# Patient Record
Sex: MALE | Race: BLACK OR AFRICAN AMERICAN | NOT HISPANIC OR LATINO | Employment: OTHER | URBAN - METROPOLITAN AREA
[De-identification: names, ages, dates, MRNs, and addresses within clinical notes are randomized per-mention and may not be internally consistent; named-entity substitution may affect disease eponyms.]

---

## 2018-02-22 ENCOUNTER — TELEPHONE (OUTPATIENT)
Dept: UROLOGY | Facility: CLINIC | Age: 53
End: 2018-02-22

## 2018-02-22 NOTE — TELEPHONE ENCOUNTER
Received referral from Dr. Levine for Prostatitis- contacted patient in regards to scheduling an appt. Patient stated that he has already seen someone for this and is under the care of Dr. Anderson.

## 2019-04-25 ENCOUNTER — OFFICE VISIT (OUTPATIENT)
Dept: PODIATRY | Facility: CLINIC | Age: 54
End: 2019-04-25
Payer: MEDICARE

## 2019-04-25 VITALS
RESPIRATION RATE: 18 BRPM | TEMPERATURE: 98 F | SYSTOLIC BLOOD PRESSURE: 183 MMHG | BODY MASS INDEX: 36.4 KG/M2 | HEIGHT: 71 IN | HEART RATE: 60 BPM | DIASTOLIC BLOOD PRESSURE: 105 MMHG | WEIGHT: 260 LBS

## 2019-04-25 DIAGNOSIS — M20.42 HAMMER TOE OF LEFT FOOT: ICD-10-CM

## 2019-04-25 DIAGNOSIS — E11.9 TYPE 2 DIABETES MELLITUS WITHOUT COMPLICATION, WITHOUT LONG-TERM CURRENT USE OF INSULIN: Primary | ICD-10-CM

## 2019-04-25 PROCEDURE — 99213 OFFICE O/P EST LOW 20 MIN: CPT | Mod: PBBFAC,PN | Performed by: PODIATRIST

## 2019-04-25 PROCEDURE — 99202 PR OFFICE/OUTPT VISIT, NEW, LEVL II, 15-29 MIN: ICD-10-PCS | Mod: S$PBB,,, | Performed by: PODIATRIST

## 2019-04-25 PROCEDURE — 99202 OFFICE O/P NEW SF 15 MIN: CPT | Mod: S$PBB,,, | Performed by: PODIATRIST

## 2019-04-25 PROCEDURE — 99999 PR PBB SHADOW E&M-EST. PATIENT-LVL III: CPT | Mod: PBBFAC,,, | Performed by: PODIATRIST

## 2019-04-25 PROCEDURE — 99999 PR PBB SHADOW E&M-EST. PATIENT-LVL III: ICD-10-PCS | Mod: PBBFAC,,, | Performed by: PODIATRIST

## 2019-04-25 RX ORDER — IBUPROFEN 600 MG/1
TABLET ORAL
COMMUNITY

## 2019-04-25 RX ORDER — OXYCODONE HYDROCHLORIDE 15 MG/1
30 TABLET ORAL
COMMUNITY

## 2019-04-25 RX ORDER — LISINOPRIL 40 MG/1
TABLET ORAL
COMMUNITY

## 2019-04-25 RX ORDER — HYDROCHLOROTHIAZIDE 25 MG/1
TABLET ORAL
COMMUNITY
End: 2023-09-13

## 2019-04-25 RX ORDER — GABAPENTIN 400 MG/1
CAPSULE ORAL
COMMUNITY

## 2019-04-25 RX ORDER — ASPIRIN 81 MG/1
TABLET ORAL
COMMUNITY

## 2019-04-25 RX ORDER — GLIPIZIDE 10 MG/1
TABLET ORAL
COMMUNITY

## 2019-04-25 RX ORDER — UREA 40 %
CREAM (GRAM) TOPICAL
COMMUNITY
Start: 2019-03-26

## 2019-04-25 RX ORDER — ALBUTEROL SULFATE 90 UG/1
AEROSOL, METERED RESPIRATORY (INHALATION)
COMMUNITY

## 2019-04-25 RX ORDER — METFORMIN HYDROCHLORIDE 1000 MG/1
TABLET ORAL
COMMUNITY

## 2019-04-25 RX ORDER — PHENOL/SODIUM PHENOLATE
AEROSOL, SPRAY (ML) MUCOUS MEMBRANE
COMMUNITY

## 2019-04-28 PROBLEM — E11.9 TYPE 2 DIABETES MELLITUS WITHOUT COMPLICATION, WITHOUT LONG-TERM CURRENT USE OF INSULIN: Status: ACTIVE | Noted: 2019-04-28

## 2019-04-28 PROBLEM — M20.42 HAMMER TOE OF LEFT FOOT: Status: ACTIVE | Noted: 2019-04-28

## 2019-04-28 NOTE — PROGRESS NOTES
Subjective:       Patient ID: Harish Recinos is a 53 y.o. male.    Chief Complaint: Diabetic Foot Exam   Patient presents today for diabetic foot evaluation patient is a diabetic since 2011 [facility] patient is complaining about a painful 5th toe on the left foot.  HPI  Review of Systems   Musculoskeletal: Positive for arthralgias.   All other systems reviewed and are negative.      Objective:      Physical Exam   Constitutional: He appears well-developed and well-nourished.   Cardiovascular:   Pulses:       Dorsalis pedis pulses are 1+ on the right side, and 1+ on the left side.        Posterior tibial pulses are 1+ on the right side, and 1+ on the left side.   Pulmonary/Chest: Effort normal.   Musculoskeletal: He exhibits tenderness and deformity.        Left foot: There is deformity.   Feet:   Right Foot:   Protective Sensation: 4 sites tested. 4 sites sensed.   Left Foot:   Protective Sensation: 4 sites tested. 4 sites sensed.   Skin Integrity: Positive for ulcer, skin breakdown, erythema and callus.   Neurological: He is alert.   Skin: Skin is warm. Capillary refill takes 2 to 3 seconds.   Psychiatric: He has a normal mood and affect. His behavior is normal. Judgment and thought content normal.   Nursing note and vitals reviewed.      Assessment:       1. Type 2 diabetes mellitus without complication, without long-term current use of insulin    2. Hammer toe of left foot        Plan:       Patient presents today for diabetic evaluation patient is a type 2 diabetic since 2011 and is complaining of a painful 5th toe on the left foot. A complete diabetic exam was performed today patient's vascular status is within normal limits patient has no signs of diabetic related neuropathy sharp dull and vibratory sensations are intact bilateral protective sensation is noted to be intact bilateral.  Patient does have a contracture of the 5th digit left foot there is a hyperkeratotic lesion  present and pre area of pre ulceration on the 5th digit left non excisional debridement was performed removing nonviable skin and tissue I have made some recommendations for the patient to offload pressure from the area and different types of pads that he can use to prevent rubbing.  Patient advised to monitor this closely any increased redness swelling pain discomfort he is to contact us immediately.  Recommended follow-up is every 6 months unless the patient has any problems questions or concerns prior that time.

## 2020-12-01 NOTE — LETTER
April 28, 2019      Radha Stock, [facility]  [address]  [place] [address]           [facility]  [facility]  [address] [place] [address]  Phone: [phone]  Fax: [phone]          Patient: Harish Recinos   MR Number: [id number]   YOB: 1965   Date of Visit: 4/25/2019       Dear Radha Stock:    Thank you for referring Harish Recinos to me for evaluation. Attached you will find relevant portions of my assessment and plan of care.    If you have questions, please do not hesitate to call me. I look forward to following Harish Recinos along with you.    Sincerely,    Hakan Buck, HORACIO    Enclosure  CC:  No Recipients    If you would like to receive this communication electronically, please contact [email or web address] or [phone] to request more information on [employer] access.    For providers and/or their staff who would like to refer a patient to [facility], please contact us through our one-stop-shop provider referral line, [facility], at [phone].    If you feel you have received this communication in error or would no longer like to receive these types of communications, please e-mail [email or web address]          Have her come back in so we can evalulate

## 2021-12-03 DIAGNOSIS — R56.9 GENERALIZED-ONSET SEIZURES: Primary | ICD-10-CM

## 2022-01-05 ENCOUNTER — HOSPITAL ENCOUNTER (OUTPATIENT)
Dept: RADIOLOGY | Facility: HOSPITAL | Age: 57
Discharge: HOME | End: 2022-01-05
Attending: PSYCHIATRY & NEUROLOGY
Payer: MEDICARE

## 2022-01-05 DIAGNOSIS — R56.9 GENERALIZED-ONSET SEIZURES: ICD-10-CM

## 2022-01-05 PROCEDURE — 70551 MRI BRAIN STEM W/O DYE: CPT | Mod: TC,PO

## 2023-09-13 ENCOUNTER — PATIENT MESSAGE (OUTPATIENT)
Dept: GASTROENTEROLOGY | Facility: CLINIC | Age: 58
End: 2023-09-13
Payer: MEDICARE

## 2023-09-13 ENCOUNTER — LAB VISIT (OUTPATIENT)
Dept: LAB | Facility: CLINIC | Age: 58
End: 2023-09-13
Payer: MEDICARE

## 2023-09-13 ENCOUNTER — OFFICE VISIT (OUTPATIENT)
Dept: FAMILY MEDICINE | Facility: CLINIC | Age: 58
End: 2023-09-13
Payer: MEDICARE

## 2023-09-13 ENCOUNTER — TELEPHONE (OUTPATIENT)
Dept: ORTHOPEDICS | Facility: CLINIC | Age: 58
End: 2023-09-13
Payer: MEDICARE

## 2023-09-13 VITALS
SYSTOLIC BLOOD PRESSURE: 114 MMHG | HEART RATE: 66 BPM | HEIGHT: 71 IN | WEIGHT: 315 LBS | DIASTOLIC BLOOD PRESSURE: 84 MMHG | TEMPERATURE: 98 F | OXYGEN SATURATION: 98 % | BODY MASS INDEX: 44.1 KG/M2

## 2023-09-13 DIAGNOSIS — N52.9 ERECTILE DYSFUNCTION, UNSPECIFIED ERECTILE DYSFUNCTION TYPE: ICD-10-CM

## 2023-09-13 DIAGNOSIS — E11.42 DIABETIC PERIPHERAL NEUROPATHY ASSOCIATED WITH TYPE 2 DIABETES MELLITUS: ICD-10-CM

## 2023-09-13 DIAGNOSIS — E11.9 TYPE 2 DIABETES MELLITUS WITHOUT COMPLICATION, WITHOUT LONG-TERM CURRENT USE OF INSULIN: ICD-10-CM

## 2023-09-13 DIAGNOSIS — Z13.6 ENCOUNTER FOR LIPID SCREENING FOR CARDIOVASCULAR DISEASE: ICD-10-CM

## 2023-09-13 DIAGNOSIS — Z11.4 ENCOUNTER FOR SCREENING FOR HIV: ICD-10-CM

## 2023-09-13 DIAGNOSIS — E78.5 HYPERLIPIDEMIA, UNSPECIFIED HYPERLIPIDEMIA TYPE: ICD-10-CM

## 2023-09-13 DIAGNOSIS — Z12.11 COLON CANCER SCREENING: ICD-10-CM

## 2023-09-13 DIAGNOSIS — Z13.220 ENCOUNTER FOR LIPID SCREENING FOR CARDIOVASCULAR DISEASE: ICD-10-CM

## 2023-09-13 DIAGNOSIS — M51.37 DEGENERATION OF LUMBAR OR LUMBOSACRAL INTERVERTEBRAL DISC: ICD-10-CM

## 2023-09-13 DIAGNOSIS — G89.29 CHRONIC PAIN OF LEFT KNEE: ICD-10-CM

## 2023-09-13 DIAGNOSIS — I10 BENIGN ESSENTIAL HYPERTENSION: ICD-10-CM

## 2023-09-13 DIAGNOSIS — G40.909 SEIZURE DISORDER: ICD-10-CM

## 2023-09-13 DIAGNOSIS — Z12.5 PROSTATE CANCER SCREENING: ICD-10-CM

## 2023-09-13 DIAGNOSIS — M25.562 CHRONIC PAIN OF LEFT KNEE: ICD-10-CM

## 2023-09-13 DIAGNOSIS — E11.9 TYPE 2 DIABETES MELLITUS WITHOUT COMPLICATION, WITHOUT LONG-TERM CURRENT USE OF INSULIN: Primary | ICD-10-CM

## 2023-09-13 PROBLEM — K76.0 STEATOSIS OF LIVER: Status: ACTIVE | Noted: 2023-09-13

## 2023-09-13 PROBLEM — Z98.84 HISTORY OF GASTRIC BYPASS: Status: ACTIVE | Noted: 2023-09-13

## 2023-09-13 PROBLEM — M54.12 BRACHIAL NEURITIS: Status: ACTIVE | Noted: 2023-09-13

## 2023-09-13 PROBLEM — K21.9 GASTROESOPHAGEAL REFLUX DISEASE: Status: ACTIVE | Noted: 2023-09-13

## 2023-09-13 LAB
ALBUMIN SERPL BCP-MCNC: 3.4 G/DL (ref 3.5–5.2)
ALP SERPL-CCNC: 89 U/L (ref 55–135)
ALT SERPL W/O P-5'-P-CCNC: 24 U/L (ref 10–44)
ANION GAP SERPL CALC-SCNC: 9 MMOL/L (ref 8–16)
AST SERPL-CCNC: 32 U/L (ref 10–40)
BILIRUB SERPL-MCNC: 0.4 MG/DL (ref 0.1–1)
BUN SERPL-MCNC: 10 MG/DL (ref 6–20)
CALCIUM SERPL-MCNC: 8.8 MG/DL (ref 8.7–10.5)
CHLORIDE SERPL-SCNC: 102 MMOL/L (ref 95–110)
CHOLEST SERPL-MCNC: 195 MG/DL (ref 120–199)
CHOLEST/HDLC SERPL: 4.4 {RATIO} (ref 2–5)
CO2 SERPL-SCNC: 26 MMOL/L (ref 23–29)
COMPLEXED PSA SERPL-MCNC: 0.66 NG/ML (ref 0–4)
CREAT SERPL-MCNC: 1.2 MG/DL (ref 0.5–1.4)
EST. GFR  (NO RACE VARIABLE): >60 ML/MIN/1.73 M^2
ESTIMATED AVG GLUCOSE: 131 MG/DL (ref 68–131)
GLUCOSE SERPL-MCNC: 113 MG/DL (ref 70–110)
HBA1C MFR BLD: 6.2 % (ref 4–5.6)
HDLC SERPL-MCNC: 44 MG/DL (ref 40–75)
HDLC SERPL: 22.6 % (ref 20–50)
HIV 1+2 AB+HIV1 P24 AG SERPL QL IA: NORMAL
LDLC SERPL CALC-MCNC: 129.4 MG/DL (ref 63–159)
NONHDLC SERPL-MCNC: 151 MG/DL
POTASSIUM SERPL-SCNC: 4.2 MMOL/L (ref 3.5–5.1)
PROT SERPL-MCNC: 7.9 G/DL (ref 6–8.4)
SODIUM SERPL-SCNC: 137 MMOL/L (ref 136–145)
TRIGL SERPL-MCNC: 108 MG/DL (ref 30–150)
TSH SERPL DL<=0.005 MIU/L-ACNC: 2.33 UIU/ML (ref 0.4–4)

## 2023-09-13 PROCEDURE — 80053 COMPREHEN METABOLIC PANEL: CPT

## 2023-09-13 PROCEDURE — 83036 HEMOGLOBIN GLYCOSYLATED A1C: CPT

## 2023-09-13 PROCEDURE — 99204 OFFICE O/P NEW MOD 45 MIN: CPT | Mod: ,,,

## 2023-09-13 PROCEDURE — 87389 HIV-1 AG W/HIV-1&-2 AB AG IA: CPT

## 2023-09-13 PROCEDURE — 80061 LIPID PANEL: CPT

## 2023-09-13 PROCEDURE — 84153 ASSAY OF PSA TOTAL: CPT

## 2023-09-13 PROCEDURE — 99204 PR OFFICE/OUTPT VISIT, NEW, LEVL IV, 45-59 MIN: ICD-10-PCS | Mod: ,,,

## 2023-09-13 PROCEDURE — 84443 ASSAY THYROID STIM HORMONE: CPT

## 2023-09-13 RX ORDER — SILDENAFIL 50 MG/1
TABLET, FILM COATED ORAL
COMMUNITY
End: 2023-09-13

## 2023-09-13 RX ORDER — AMLODIPINE BESYLATE 10 MG/1
10 TABLET ORAL
COMMUNITY
Start: 2023-02-27

## 2023-09-13 RX ORDER — TADALAFIL 10 MG/1
10 TABLET ORAL DAILY PRN
Qty: 20 TABLET | Refills: 1 | Status: SHIPPED | OUTPATIENT
Start: 2023-09-13 | End: 2024-09-12

## 2023-09-13 RX ORDER — OXYCODONE HYDROCHLORIDE 30 MG/1
30 TABLET ORAL
COMMUNITY
Start: 2023-08-16

## 2023-09-13 RX ORDER — TIZANIDINE 4 MG/1
4 TABLET ORAL 2 TIMES DAILY PRN
COMMUNITY
Start: 2023-08-16

## 2023-09-13 RX ORDER — VIT C/E/ZN/COPPR/LUTEIN/ZEAXAN 250MG-90MG
CAPSULE ORAL
COMMUNITY
End: 2023-09-13

## 2023-09-13 RX ORDER — LEVETIRACETAM 750 MG/1
750 TABLET ORAL 2 TIMES DAILY
COMMUNITY
Start: 2023-08-31

## 2023-09-13 NOTE — PROGRESS NOTES
Subjective:       Patient ID: Harish Recinos is a 58 y.o. male.    Chief Complaint: Establish Care, Back Pain (Lower back, pain travels down into the left hip and leg. Pain began 3-4 months ago. Denies any injury. Denies any numbness or tingling.), Knee Pain (Left knee, pain began two months. Recalls no injury. Some swelling. Pain is constant and throbbing.), Hypertension (Taking his medication as directed w/o any problems or concerns ), and Diabetes (Doing diet only. Pt states no longer on any medications)    Patient presents to the clinic to establish care.     Patient Active Problem List:     Type 2 diabetes mellitus without complication, without long-term current use of insulin     Hammer toe of left foot     Brachial neuritis     Degeneration of lumbar or lumbosacral intervertebral disc     Diabetic peripheral neuropathy associated with type 2 diabetes mellitus     Diet-controlled type 2 diabetes mellitus     Gastroesophageal reflux disease     History of gastric bypass     Hyperlipidemia     Hypovitaminosis D     Morbid obesity     Seizure disorder     Observed sleep apnea     Steatosis of liver    Hypertension-  BP Readings from Last 3 Encounters:  09/13/23 : 114/84  04/25/19 : (!) 183/105  Taking Norvasc and Lisinopril.     Diabetes-   States he is diet controlled.   Does not check blood sugars at home.   Was previously on diabetic medication but states those medications were discontinued.     History of seizures- On Keppra- states he follows with Neurology but cannot remember the name.     Colonoscopy- states he has never had a colonoscopy- will order    Reports chronic back pain. Was seeing Dr. New- states he is no longer seeing him. Requests a refill on his pain medications. Explained to patient that I can place a new pain management referral but cannot refill his pain medications. He would like to see [facility] in [place], [place].     Reports chronic left knee pain. States he was told he  has needs a knee replacement. Will place ortho referral.     Reports erectile dysfunction- would like refill on Cialis.     Has no other complaints or concerns today.     Patient educated on plan of care, verbalized understanding.               Review of Systems   Constitutional:  Negative for activity change, appetite change, chills, diaphoresis and fever.   HENT:  Negative for congestion, ear pain, postnasal drip, sinus pressure, sneezing and sore throat.    Eyes:  Negative for pain, discharge, redness and itching.   Respiratory:  Negative for apnea, cough, chest tightness, shortness of breath and wheezing.    Cardiovascular:  Negative for chest pain and leg swelling.   Gastrointestinal:  Negative for abdominal distention, abdominal pain, constipation, diarrhea, nausea and vomiting.   Genitourinary:  Negative for difficulty urinating, dysuria, flank pain and frequency.   Musculoskeletal:  Positive for arthralgias, back pain and myalgias.        Left knee pain   Skin:  Negative for color change, rash and wound.   Neurological:  Negative for dizziness.   All other systems reviewed and are negative.      Patient Active Problem List   Diagnosis    Type 2 diabetes mellitus without complication, without long-term current use of insulin    Hammer toe of left foot    Brachial neuritis    Degeneration of lumbar or lumbosacral intervertebral disc    Diabetic peripheral neuropathy associated with type 2 diabetes mellitus    Diet-controlled type 2 diabetes mellitus    Gastroesophageal reflux disease    History of gastric bypass    Hyperlipidemia    Hypovitaminosis D    Morbid obesity    Seizure disorder    Observed sleep apnea    Steatosis of liver       Objective:      Physical Exam  Vitals and nursing note reviewed.   Constitutional:       Appearance: Normal appearance. He is obese. He is not ill-appearing.   HENT:      Head: Normocephalic and atraumatic.      Nose: Nose normal.   Eyes:      General: Lids are normal.  "  Cardiovascular:      Rate and Rhythm: Normal rate and regular rhythm.      Pulses: Normal pulses.      Heart sounds: Normal heart sounds.   Pulmonary:      Effort: Pulmonary effort is normal. No tachypnea or respiratory distress.      Breath sounds: Normal breath sounds. No wheezing.   Abdominal:      General: Bowel sounds are normal. There is no distension.      Palpations: Abdomen is soft.      Tenderness: There is no abdominal tenderness.   Musculoskeletal:         General: Normal range of motion.      Cervical back: Full passive range of motion without pain and normal range of motion.      Left lower leg: No edema.   Skin:     General: Skin is warm and dry.   Neurological:      Mental Status: He is alert and oriented to person, place, and time.   Psychiatric:         Mood and Affect: Mood normal.         Behavior: Behavior normal.         No results found for: "WBC", "HGB", "HCT", "PLT", "CHOL", "TRIG", "HDL", "LDLDIRECT", "ALT", "AST", "NA", "K", "CL", "CREATININE", "BUN", "CO2", "TSH", "PSA", "INR", "GLUF", "HGBA1C", "MICROALBUR"  The ASCVD Risk score (Yudy DK, et al., 2019) failed to calculate for the following reasons:    Cannot find a previous HDL lab    Cannot find a previous total cholesterol lab  Visit Vitals  /84 (BP Location: Right arm, Patient Position: Sitting, BP Method: Large (Manual))   Pulse 66   Temp 97.9 °F (36.6 °C) (Temporal)   Ht 5' 11" (1.803 m)   Wt (!) 145.7 kg (321 lb 3.4 oz)   SpO2 98%   BMI 44.80 kg/m²      Assessment:       1. Type 2 diabetes mellitus without complication, without long-term current use of insulin    2. Diabetic peripheral neuropathy associated with type 2 diabetes mellitus    3. Seizure disorder    4. Degeneration of lumbar or lumbosacral intervertebral disc    5. Erectile dysfunction, unspecified erectile dysfunction type    6. Chronic pain of left knee    7. Prostate cancer screening    8. Encounter for lipid screening for cardiovascular disease    9. " Encounter for screening for HIV    10. Colon cancer screening        Plan:       1. Type 2 diabetes mellitus without complication, without long-term current use of insulin/Diabetic peripheral neuropathy associated with type 2 diabetes mellitus  -     Hemoglobin A1C; Future; Expected date: 09/13/2023  -     Comprehensive metabolic panel; Future; Expected date: 09/13/2023  -     TSH; Future; Expected date: 09/13/2023   - Diabetic Diet    2. Seizure disorder   - Stable-Continue Keppra   - Continue current plan of care    3. Degeneration of lumbar or lumbosacral intervertebral disc  -     Ambulatory referral/consult to Pain Clinic; Future; Expected date: 09/20/2023    4. Erectile dysfunction, unspecified erectile dysfunction type  -     tadalafiL (CIALIS) 10 MG tablet; Take 1 tablet (10 mg total) by mouth daily as needed for Erectile Dysfunction.  Dispense: 20 tablet; Refill: 1    5. Chronic pain of left knee  -     Ambulatory referral/consult to Orthopedics; Future; Expected date: 09/20/2023    6. Benign essential hypertension   - Stable-Continue Norvasc and Lisinopril   - Continue current plan of care    7. Hyperlipidemia, unspecified hyperlipidemia type/Encounter for lipid screening for cardiovascular disease   - Stable-Continue low fat, low cholesterol diet   - Continue current plan of care  -     Lipid Panel; Future; Expected date: 09/13/2023    8. Prostate cancer screening  -     PSA, Screening; Future; Expected date: 09/13/2023    9. Encounter for screening for HIV  -     HIV 1/2 Ag/Ab (4th Gen); Future; Expected date: 09/13/2023    10. Colon cancer screening  -     Case Request Endoscopy: COLONOSCOPY       Follow up if symptoms worsen or fail to improve.      Future Appointments       Date Specialty Appt Notes    9/13/2023 Lab labs

## 2023-09-13 NOTE — PATIENT INSTRUCTIONS

## 2024-01-17 ENCOUNTER — PATIENT MESSAGE (OUTPATIENT)
Dept: GASTROENTEROLOGY | Facility: CLINIC | Age: 59
End: 2024-01-17
Payer: MEDICARE

## 2024-05-09 ENCOUNTER — TELEPHONE (OUTPATIENT)
Dept: FAMILY MEDICINE | Facility: CLINIC | Age: 59
End: 2024-05-09
Payer: MEDICARE

## 2024-05-09 NOTE — TELEPHONE ENCOUNTER
Called pt to discuss the change with Ms Chelle [facility] practice, spoke to wife also ( [phone] ), [facility]